# Patient Record
Sex: FEMALE
[De-identification: names, ages, dates, MRNs, and addresses within clinical notes are randomized per-mention and may not be internally consistent; named-entity substitution may affect disease eponyms.]

---

## 2020-12-07 ENCOUNTER — HOSPITAL ENCOUNTER (OUTPATIENT)
Dept: HOSPITAL 47 - RADMAMWWP | Age: 40
Discharge: HOME | End: 2020-12-07
Attending: FAMILY MEDICINE
Payer: COMMERCIAL

## 2020-12-07 DIAGNOSIS — Z12.31: Primary | ICD-10-CM

## 2020-12-07 PROCEDURE — 77063 BREAST TOMOSYNTHESIS BI: CPT

## 2020-12-07 PROCEDURE — 77067 SCR MAMMO BI INCL CAD: CPT

## 2020-12-07 NOTE — MM
Reason for exam: screening  (asymptomatic).

Baseline mammogram.



History:

Family history of breast cancer in maternal aunt at age 28.

Taking hormonal contraceptives beginning at age 18.



Physical Findings:

Nurse did not find any significant physical abnormalities on exam.



MG 3D Screening Mammo W/Cad

Bilateral CC, MLO, and XCCL view(s) were taken.

The breast tissue is heterogeneously dense. This may lower the sensitivity of 

mammography.  There is no discrete abnormality. Benign right axillary lymph nodes.



These results were verbally communicated with the patient and result sheet given 

to the patient on 12/7/20.





ASSESSMENT: Benign, BI-RAD 2



RECOMMENDATION:

Routine screening mammogram of both breasts in 1 year.

## 2021-10-20 ENCOUNTER — HOSPITAL ENCOUNTER (EMERGENCY)
Dept: HOSPITAL 47 - EC | Age: 41
Discharge: HOME | End: 2021-10-20
Payer: COMMERCIAL

## 2021-10-20 VITALS — HEART RATE: 76 BPM | TEMPERATURE: 98.3 F | SYSTOLIC BLOOD PRESSURE: 119 MMHG | DIASTOLIC BLOOD PRESSURE: 79 MMHG

## 2021-10-20 VITALS — RESPIRATION RATE: 16 BRPM

## 2021-10-20 DIAGNOSIS — R10.84: Primary | ICD-10-CM

## 2021-10-20 LAB
ALBUMIN SERPL-MCNC: 4 G/DL (ref 3.5–5)
ALP SERPL-CCNC: 53 U/L (ref 38–126)
ALT SERPL-CCNC: 16 U/L (ref 4–34)
ANION GAP SERPL CALC-SCNC: 7 MMOL/L
APTT BLD: 22.7 SEC (ref 22–30)
AST SERPL-CCNC: 20 U/L (ref 14–36)
BASOPHILS # BLD AUTO: 0.1 K/UL (ref 0–0.2)
BASOPHILS NFR BLD AUTO: 0 %
BUN SERPL-SCNC: 12 MG/DL (ref 7–17)
CALCIUM SPEC-MCNC: 9 MG/DL (ref 8.4–10.2)
CHLORIDE SERPL-SCNC: 108 MMOL/L (ref 98–107)
CO2 SERPL-SCNC: 22 MMOL/L (ref 22–30)
EOSINOPHIL # BLD AUTO: 0.2 K/UL (ref 0–0.7)
EOSINOPHIL NFR BLD AUTO: 1 %
ERYTHROCYTE [DISTWIDTH] IN BLOOD BY AUTOMATED COUNT: 4.79 M/UL (ref 3.8–5.4)
ERYTHROCYTE [DISTWIDTH] IN BLOOD: 12.9 % (ref 11.5–15.5)
GLUCOSE SERPL-MCNC: 98 MG/DL (ref 74–99)
HCT VFR BLD AUTO: 44.8 % (ref 34–46)
HGB BLD-MCNC: 15 GM/DL (ref 11.4–16)
INR PPP: 0.9 (ref ?–1.2)
LIPASE SERPL-CCNC: 82 U/L (ref 23–300)
LYMPHOCYTES # SPEC AUTO: 2.2 K/UL (ref 1–4.8)
LYMPHOCYTES NFR SPEC AUTO: 18 %
MAGNESIUM SPEC-SCNC: 2.3 MG/DL (ref 1.6–2.3)
MCH RBC QN AUTO: 31.3 PG (ref 25–35)
MCHC RBC AUTO-ENTMCNC: 33.5 G/DL (ref 31–37)
MCV RBC AUTO: 93.4 FL (ref 80–100)
MONOCYTES # BLD AUTO: 0.5 K/UL (ref 0–1)
MONOCYTES NFR BLD AUTO: 4 %
NEUTROPHILS # BLD AUTO: 9.3 K/UL (ref 1.3–7.7)
NEUTROPHILS NFR BLD AUTO: 75 %
PLATELET # BLD AUTO: 321 K/UL (ref 150–450)
POTASSIUM SERPL-SCNC: 4 MMOL/L (ref 3.5–5.1)
PROT SERPL-MCNC: 7.1 G/DL (ref 6.3–8.2)
PT BLD: 10.2 SEC (ref 9–12)
SODIUM SERPL-SCNC: 137 MMOL/L (ref 137–145)
WBC # BLD AUTO: 12.4 K/UL (ref 3.8–10.6)

## 2021-10-20 PROCEDURE — 83605 ASSAY OF LACTIC ACID: CPT

## 2021-10-20 PROCEDURE — 96375 TX/PRO/DX INJ NEW DRUG ADDON: CPT

## 2021-10-20 PROCEDURE — 83735 ASSAY OF MAGNESIUM: CPT

## 2021-10-20 PROCEDURE — 84484 ASSAY OF TROPONIN QUANT: CPT

## 2021-10-20 PROCEDURE — 85730 THROMBOPLASTIN TIME PARTIAL: CPT

## 2021-10-20 PROCEDURE — 99284 EMERGENCY DEPT VISIT MOD MDM: CPT

## 2021-10-20 PROCEDURE — 96374 THER/PROPH/DIAG INJ IV PUSH: CPT

## 2021-10-20 PROCEDURE — 96361 HYDRATE IV INFUSION ADD-ON: CPT

## 2021-10-20 PROCEDURE — 85610 PROTHROMBIN TIME: CPT

## 2021-10-20 PROCEDURE — 84100 ASSAY OF PHOSPHORUS: CPT

## 2021-10-20 PROCEDURE — 80053 COMPREHEN METABOLIC PANEL: CPT

## 2021-10-20 PROCEDURE — 83690 ASSAY OF LIPASE: CPT

## 2021-10-20 PROCEDURE — 85025 COMPLETE CBC W/AUTO DIFF WBC: CPT

## 2021-10-20 NOTE — ED
Abdominal Pain HPI





- General


Chief Complaint: Recheck/Abnormal Lab/Rx


Stated Complaint: Abd Pain


Time Seen by Provider: 10/20/21 00:37


Source: patient, RN notes reviewed, old records reviewed


Mode of arrival: ambulatory


Limitations: no limitations





- History of Present Illness


Initial Comments: 





This is a 40-year-old female to the ER for evaluation.  Patient presents today 

for evaluation regards to not feeling well with nausea and abdominal pain.  

Patient has history of needing a lot of wild mushrooms.  Patient did for.


While motions again tonight and after dinner the symptoms began.  No fevers's 

some nausea without vomiting.  No diarrhea.  No prior history of similar 

complaints


MD Complaint: abdominal pain


-: days(s)


Location: diffuse, epigastric, suprapubic


Radiation: epigastric, suprapubic


Migration to: epigastric, suprapubic


Severity: mild


Severity scale (1-10): 3


Quality: stabbing, aching


Improves With: nothing


Worsens With: nothing


Associated Symptoms: nausea, vomiting


Treatments Prior to Arrival: other (none)





- Related Data


                                    Allergies











Allergy/AdvReac Type Severity Reaction Status Date / Time


 


No Known Allergies Allergy   Verified 10/20/21 00:35














Review of Systems


ROS Statement: 


Those systems with pertinent positive or pertinent negative responses have been 

documented in the HPI.





ROS Other: All systems not noted in ROS Statement are negative.





Past Medical History


Past Medical History: No Reported History


History of Any Multi-Drug Resistant Organisms: None Reported


Past Surgical History:  Section, Orthopedic Surgery


Past Psychological History: No Psychological Hx Reported


Smoking Status: Never smoker


Past Alcohol Use History: Occasional


Past Drug Use History: None Reported





General Exam


Limitations: no limitations


General appearance: alert, in no apparent distress, anxious


Head exam: Present: atraumatic, normocephalic, normal inspection


Eye exam: Present: normal appearance, PERRL, EOMI.  Absent: scleral icterus, 

conjunctival injection, periorbital swelling


ENT exam: Present: normal exam, mucous membranes moist


Neck exam: Present: normal inspection.  Absent: tenderness, meningismus, 

lymphadenopathy


Respiratory exam: Present: normal lung sounds bilaterally.  Absent: respiratory 

distress, wheezes, rales, rhonchi, stridor


Cardiovascular Exam: Present: regular rate, normal rhythm, normal heart sounds. 

Absent: systolic murmur, diastolic murmur, rubs, gallop, clicks


GI/Abdominal exam: Present: soft, normal bowel sounds.  Absent: distended, 

tenderness, guarding, rebound, rigid


Extremities exam: Present: normal inspection, full ROM, normal capillary refill.

 Absent: tenderness, pedal edema, joint swelling, calf tenderness


Back exam: Present: normal inspection


Neurological exam: Present: alert, oriented X3, CN II-XII intact


Psychiatric exam: Present: normal affect, normal mood


Skin exam: Present: warm, dry, intact, normal color.  Absent: rash





Course


                                   Vital Signs











  10/20/21 10/20/21 10/20/21





  00:30 01:53 03:00


 


Temperature 98.6 F 98.1 F 


 


Pulse Rate 108 H 79 83


 


Respiratory 20 16 16





Rate   


 


Blood Pressure 122/80 118/74 121/78


 


O2 Sat by Pulse 99 99 99





Oximetry   














  10/20/21





  04:02


 


Temperature 98.3 F


 


Pulse Rate 76


 


Respiratory 16





Rate 


 


Blood Pressure 119/79


 


O2 Sat by Pulse 99





Oximetry 














- Reevaluation(s)


Reevaluation #1: 





Medical record is reviewed





Patient symptoms are significantly improved





Patient family informed results questions answered








Medical Decision Making





- Medical Decision Making





40 female to the emergency department for evaluation of abdominal pain with 

nausea concern for amount of consumption overall mushrooms.  His lab values are 

normal here in the ER she feels improved and can be discharged home





- Lab Data


Result diagrams: 


                                 10/20/21 01:34





                                 10/20/21 01:34


                                   Lab Results











  10/20/21 10/20/21 10/20/21 Range/Units





  01:34 01:34 01:34 


 


WBC  12.4 H    (3.8-10.6)  k/uL


 


RBC  4.79    (3.80-5.40)  m/uL


 


Hgb  15.0    (11.4-16.0)  gm/dL


 


Hct  44.8    (34.0-46.0)  %


 


MCV  93.4    (80.0-100.0)  fL


 


MCH  31.3    (25.0-35.0)  pg


 


MCHC  33.5    (31.0-37.0)  g/dL


 


RDW  12.9    (11.5-15.5)  %


 


Plt Count  321    (150-450)  k/uL


 


MPV  8.8    


 


Neutrophils %  75    %


 


Lymphocytes %  18    %


 


Monocytes %  4    %


 


Eosinophils %  1    %


 


Basophils %  0    %


 


Neutrophils #  9.3 H    (1.3-7.7)  k/uL


 


Lymphocytes #  2.2    (1.0-4.8)  k/uL


 


Monocytes #  0.5    (0-1.0)  k/uL


 


Eosinophils #  0.2    (0-0.7)  k/uL


 


Basophils #  0.1    (0-0.2)  k/uL


 


PT   10.2   (9.0-12.0)  sec


 


INR   0.9   (<1.2)  


 


APTT   22.7   (22.0-30.0)  sec


 


Sodium    137  (137-145)  mmol/L


 


Potassium    4.0  (3.5-5.1)  mmol/L


 


Chloride    108 H  ()  mmol/L


 


Carbon Dioxide    22  (22-30)  mmol/L


 


Anion Gap    7  mmol/L


 


BUN    12  (7-17)  mg/dL


 


Creatinine    0.57  (0.52-1.04)  mg/dL


 


Est GFR (CKD-EPI)AfAm    >90  (>60 ml/min/1.73 sqM)  


 


Est GFR (CKD-EPI)NonAf    >90  (>60 ml/min/1.73 sqM)  


 


Glucose    98  (74-99)  mg/dL


 


Plasma Lactic Acid Jt     (0.7-2.0)  mmol/L


 


Calcium    9.0  (8.4-10.2)  mg/dL


 


Phosphorus    3.2  (2.5-4.5)  mg/dL


 


Magnesium    2.3  (1.6-2.3)  mg/dL


 


Total Bilirubin    0.8  (0.2-1.3)  mg/dL


 


AST    20  (14-36)  U/L


 


ALT    16  (4-34)  U/L


 


Alkaline Phosphatase    53  ()  U/L


 


Troponin I     (0.000-0.034)  ng/mL


 


Total Protein    7.1  (6.3-8.2)  g/dL


 


Albumin    4.0  (3.5-5.0)  g/dL


 


Lipase    82  ()  U/L














  10/20/21 10/20/21 Range/Units





  01:34 01:34 


 


WBC    (3.8-10.6)  k/uL


 


RBC    (3.80-5.40)  m/uL


 


Hgb    (11.4-16.0)  gm/dL


 


Hct    (34.0-46.0)  %


 


MCV    (80.0-100.0)  fL


 


MCH    (25.0-35.0)  pg


 


MCHC    (31.0-37.0)  g/dL


 


RDW    (11.5-15.5)  %


 


Plt Count    (150-450)  k/uL


 


MPV    


 


Neutrophils %    %


 


Lymphocytes %    %


 


Monocytes %    %


 


Eosinophils %    %


 


Basophils %    %


 


Neutrophils #    (1.3-7.7)  k/uL


 


Lymphocytes #    (1.0-4.8)  k/uL


 


Monocytes #    (0-1.0)  k/uL


 


Eosinophils #    (0-0.7)  k/uL


 


Basophils #    (0-0.2)  k/uL


 


PT    (9.0-12.0)  sec


 


INR    (<1.2)  


 


APTT    (22.0-30.0)  sec


 


Sodium    (137-145)  mmol/L


 


Potassium    (3.5-5.1)  mmol/L


 


Chloride    ()  mmol/L


 


Carbon Dioxide    (22-30)  mmol/L


 


Anion Gap    mmol/L


 


BUN    (7-17)  mg/dL


 


Creatinine    (0.52-1.04)  mg/dL


 


Est GFR (CKD-EPI)AfAm    (>60 ml/min/1.73 sqM)  


 


Est GFR (CKD-EPI)NonAf    (>60 ml/min/1.73 sqM)  


 


Glucose    (74-99)  mg/dL


 


Plasma Lactic Acid Jt  0.8   (0.7-2.0)  mmol/L


 


Calcium    (8.4-10.2)  mg/dL


 


Phosphorus    (2.5-4.5)  mg/dL


 


Magnesium    (1.6-2.3)  mg/dL


 


Total Bilirubin    (0.2-1.3)  mg/dL


 


AST    (14-36)  U/L


 


ALT    (4-34)  U/L


 


Alkaline Phosphatase    ()  U/L


 


Troponin I   <0.012  (0.000-0.034)  ng/mL


 


Total Protein    (6.3-8.2)  g/dL


 


Albumin    (3.5-5.0)  g/dL


 


Lipase    ()  U/L














Disposition


Clinical Impression: 


 Abdominal pain





Disposition: HOME SELF-CARE


Condition: Good


Instructions (If sedation given, give patient instructions):  Abdominal Pain 

(ED)


Is patient prescribed a controlled substance at d/c from ED?: No


Referrals: 


Dieter Le MD [Primary Care Provider] - 1-2 days

## 2022-02-25 ENCOUNTER — HOSPITAL ENCOUNTER (OUTPATIENT)
Dept: HOSPITAL 47 - ORWHC2ENDO | Age: 42
Discharge: HOME | End: 2022-02-25
Attending: INTERNAL MEDICINE
Payer: COMMERCIAL

## 2022-02-25 VITALS — TEMPERATURE: 98 F | RESPIRATION RATE: 16 BRPM

## 2022-02-25 VITALS — HEART RATE: 79 BPM | SYSTOLIC BLOOD PRESSURE: 125 MMHG | DIASTOLIC BLOOD PRESSURE: 77 MMHG

## 2022-02-25 VITALS — BODY MASS INDEX: 29.9 KG/M2

## 2022-02-25 DIAGNOSIS — K29.70: Primary | ICD-10-CM

## 2022-02-25 DIAGNOSIS — K44.9: ICD-10-CM

## 2022-02-25 PROCEDURE — 81025 URINE PREGNANCY TEST: CPT

## 2022-02-25 PROCEDURE — 43239 EGD BIOPSY SINGLE/MULTIPLE: CPT

## 2022-02-25 PROCEDURE — 88305 TISSUE EXAM BY PATHOLOGIST: CPT

## 2022-02-25 NOTE — P.PCN
Date of Procedure: 02/25/22


Procedure(s) Performed: 


BRIEF HISTORY: Patient is a 41-year-old, pleasant, white female scheduled for an

upper endoscopy as a part of evaluation of epigastric pain associated with 

nausea vomiting for the last 6 months duration.  Has excessive burping but 

denies any heartburn.  She was initiated with Prilosec with no help.   She also 

has intermittent diarrhea.. 





PROCEDURE PERFORMED: Esophagogastroduodenoscopy with biopsy.





PREOPERATIVE DIAGNOSIS: Epigastric pain/nausea vomiting on and off since 

September 2021. 





IV sedation per anesthesia. 





PROCEDURE: After informed consent was obtained, the patient  was brought into 

the endoscopy unit. IV sedation was administered by Anesthesia under continuous 

monitoring. Initially the Olympus GIF-140 video endoscope was inserted into the 

mouth. Esophagus intubated without any difficulty. It was gradually advanced 

into the stomach and duodenum and carefully examined. The bulb and the second 

part of the duodenum appeared normal.  His were done from the duodenum to rule 

out celiac disease.  The scope at this time was withdrawn to the stomach, 

adequately insufflated with air, and upon careful examination, mucosa of the 

antrum, and mild gastritis and biopsies were done from this area.  The body, 

cardia and the fundus appeared normal. The scope was then withdrawn into the 

esophagus.  Small hiatal hernia seen.  The GE junction was located at 35 cm from

the incisors. The esophagus appeared normal. There were no erosions or 

ulcerations seen and biopsies were done from the distal esophagus and the 

patient tolerated the procedure well. 





IMPRESSION: 


1.  Mild antral gastritis.


2.  Small hiatal hernia but no evidence of esophagitis.





RECOMMENDATIONS: The findings of this examination were discussed with the 

patient as well as her family.  She was advised to follow with the biopsy 

results.  Recommended to start over-the-counter Pepcid as needed for her 

symptoms and follow antireflux measures

## 2023-08-07 ENCOUNTER — HOSPITAL ENCOUNTER (OUTPATIENT)
Dept: HOSPITAL 47 - RADUSWWP | Age: 43
Discharge: HOME | End: 2023-08-07
Attending: FAMILY MEDICINE
Payer: COMMERCIAL

## 2023-08-07 DIAGNOSIS — D25.9: Primary | ICD-10-CM

## 2023-08-07 DIAGNOSIS — Z97.5: ICD-10-CM

## 2023-08-07 DIAGNOSIS — N83.01: ICD-10-CM

## 2023-08-07 PROCEDURE — 76830 TRANSVAGINAL US NON-OB: CPT

## 2023-08-07 NOTE — US
EXAMINATION TYPE: US transvaginal

 

DATE OF EXAM: 8/7/2023

 

COMPARISON: NONE

 

CLINICAL INDICATION: Female, 42 years old with history of R10.31 RT LOWER QUADRANT PAIN; pelvic pain 
and h/o multiple IUD's

 

TECHNIQUE:  TV.  Transvaginal sonographic 

 

Date of LMP:  no cycles with IUD

 

EXAM MEASUREMENTS:

 

Uterus:  7.3 x 7.8 x 4.8 cm

Endometrial Stripe: 0.6 cm

Right Ovary:  3.3 x 2.1 x 2.5 cm

Left Ovary:  3.1 x 2.1 x 2.0 cm

 

1. Uterus:  Retroverted multiple fibroids, largest to the left = 3.6 x 3.8cm  

2. Endometrium:  IUD seen, wnl

3. Right Ovary:  simple appearing cyst = 2.5 x 1.7 x 2.2cm

4. Left Ovary:  small follicle seen, wnl

5. Bilateral Adnexa:  wnl

6. Posterior cul-de-sac:  wnl

 

IMPRESSION:

1.  No evidence for acute process.

2.  Fibroid changes.

3.  IUD within the endometrium.

4.  Right ovarian dominant follicle.